# Patient Record
Sex: MALE | Race: WHITE | Employment: FULL TIME | URBAN - METROPOLITAN AREA
[De-identification: names, ages, dates, MRNs, and addresses within clinical notes are randomized per-mention and may not be internally consistent; named-entity substitution may affect disease eponyms.]

---

## 2018-12-13 ENCOUNTER — TRANSFERRED RECORDS (OUTPATIENT)
Dept: HEALTH INFORMATION MANAGEMENT | Facility: CLINIC | Age: 63
End: 2018-12-13

## 2018-12-14 ENCOUNTER — HOSPITAL ENCOUNTER (INPATIENT)
Facility: CLINIC | Age: 63
LOS: 1 days | Discharge: HOME | End: 2018-12-15
Attending: INTERNAL MEDICINE | Admitting: INTERNAL MEDICINE
Payer: COMMERCIAL

## 2018-12-14 DIAGNOSIS — J18.9 PNEUMONIA DUE TO INFECTIOUS ORGANISM, UNSPECIFIED LATERALITY, UNSPECIFIED PART OF LUNG: Primary | ICD-10-CM

## 2018-12-14 DIAGNOSIS — I10 ESSENTIAL HYPERTENSION: ICD-10-CM

## 2018-12-14 PROBLEM — R06.02 SOB (SHORTNESS OF BREATH): Status: ACTIVE | Noted: 2018-12-14

## 2018-12-14 LAB
ALT SERPL-CCNC: 16 U/L (ref 0–45)
AST SERPL-CCNC: 15 U/L (ref 0–40)
CREAT SERPL-MCNC: 0.76 MG/DL (ref 0.7–1.3)
GFR SERPL CREATININE-BSD FRML MDRD: >60 ML/MIN/1.73M2
GLUCOSE SERPL-MCNC: 98 MG/DL (ref 70–125)
MAGNESIUM SERPL-MCNC: 2.3 MG/DL (ref 1.6–2.3)
PHOSPHATE SERPL-MCNC: 2.3 MG/DL (ref 2.5–4.5)
POTASSIUM SERPL-SCNC: 3.5 MMOL/L (ref 3.5–5)
POTASSIUM SERPL-SCNC: 4.3 MMOL/L (ref 3.4–5.3)

## 2018-12-14 PROCEDURE — 83735 ASSAY OF MAGNESIUM: CPT | Performed by: PHYSICIAN ASSISTANT

## 2018-12-14 PROCEDURE — 84132 ASSAY OF SERUM POTASSIUM: CPT | Performed by: PHYSICIAN ASSISTANT

## 2018-12-14 PROCEDURE — 25000125 ZZHC RX 250: Performed by: PHYSICIAN ASSISTANT

## 2018-12-14 PROCEDURE — 40000275 ZZH STATISTIC RCP TIME EA 10 MIN

## 2018-12-14 PROCEDURE — 94640 AIRWAY INHALATION TREATMENT: CPT

## 2018-12-14 PROCEDURE — 99207 ZZC APP CREDIT; MD BILLING SHARED VISIT: CPT | Performed by: PHYSICIAN ASSISTANT

## 2018-12-14 PROCEDURE — 12000000 ZZH R&B MED SURG/OB

## 2018-12-14 PROCEDURE — 99223 1ST HOSP IP/OBS HIGH 75: CPT | Mod: AI | Performed by: INTERNAL MEDICINE

## 2018-12-14 PROCEDURE — 84100 ASSAY OF PHOSPHORUS: CPT | Performed by: PHYSICIAN ASSISTANT

## 2018-12-14 PROCEDURE — 36415 COLL VENOUS BLD VENIPUNCTURE: CPT | Performed by: PHYSICIAN ASSISTANT

## 2018-12-14 PROCEDURE — 94640 AIRWAY INHALATION TREATMENT: CPT | Mod: 76

## 2018-12-14 PROCEDURE — 25000128 H RX IP 250 OP 636: Performed by: PHYSICIAN ASSISTANT

## 2018-12-14 PROCEDURE — 25000132 ZZH RX MED GY IP 250 OP 250 PS 637: Performed by: PHYSICIAN ASSISTANT

## 2018-12-14 RX ORDER — LANOLIN ALCOHOL/MO/W.PET/CERES
100 CREAM (GRAM) TOPICAL DAILY
Status: DISCONTINUED | OUTPATIENT
Start: 2018-12-14 | End: 2018-12-15 | Stop reason: HOSPADM

## 2018-12-14 RX ORDER — MULTIPLE VITAMINS W/ MINERALS TAB 9MG-400MCG
1 TAB ORAL DAILY
Status: DISCONTINUED | OUTPATIENT
Start: 2018-12-14 | End: 2018-12-15 | Stop reason: HOSPADM

## 2018-12-14 RX ORDER — POTASSIUM CL/LIDO/0.9 % NACL 10MEQ/0.1L
10 INTRAVENOUS SOLUTION, PIGGYBACK (ML) INTRAVENOUS
Status: DISCONTINUED | OUTPATIENT
Start: 2018-12-14 | End: 2018-12-15 | Stop reason: HOSPADM

## 2018-12-14 RX ORDER — FOLIC ACID 1 MG/1
1 TABLET ORAL DAILY
Status: DISCONTINUED | OUTPATIENT
Start: 2018-12-14 | End: 2018-12-15 | Stop reason: HOSPADM

## 2018-12-14 RX ORDER — LISINOPRIL 10 MG/1
10 TABLET ORAL DAILY
Status: DISCONTINUED | OUTPATIENT
Start: 2018-12-14 | End: 2018-12-15 | Stop reason: HOSPADM

## 2018-12-14 RX ORDER — NALOXONE HYDROCHLORIDE 0.4 MG/ML
.1-.4 INJECTION, SOLUTION INTRAMUSCULAR; INTRAVENOUS; SUBCUTANEOUS
Status: DISCONTINUED | OUTPATIENT
Start: 2018-12-14 | End: 2018-12-15 | Stop reason: HOSPADM

## 2018-12-14 RX ORDER — ALBUTEROL SULFATE 0.83 MG/ML
3 SOLUTION RESPIRATORY (INHALATION)
Status: DISCONTINUED | OUTPATIENT
Start: 2018-12-14 | End: 2018-12-15 | Stop reason: HOSPADM

## 2018-12-14 RX ORDER — POTASSIUM CHLORIDE 7.45 MG/ML
10 INJECTION INTRAVENOUS
Status: DISCONTINUED | OUTPATIENT
Start: 2018-12-14 | End: 2018-12-15 | Stop reason: HOSPADM

## 2018-12-14 RX ORDER — BISACODYL 10 MG
10 SUPPOSITORY, RECTAL RECTAL DAILY PRN
Status: DISCONTINUED | OUTPATIENT
Start: 2018-12-14 | End: 2018-12-15 | Stop reason: HOSPADM

## 2018-12-14 RX ORDER — HYDRALAZINE HYDROCHLORIDE 20 MG/ML
10 INJECTION INTRAMUSCULAR; INTRAVENOUS EVERY 4 HOURS PRN
Status: DISCONTINUED | OUTPATIENT
Start: 2018-12-14 | End: 2018-12-15 | Stop reason: HOSPADM

## 2018-12-14 RX ORDER — POTASSIUM CHLORIDE 1500 MG/1
20-40 TABLET, EXTENDED RELEASE ORAL
Status: DISCONTINUED | OUTPATIENT
Start: 2018-12-14 | End: 2018-12-15 | Stop reason: HOSPADM

## 2018-12-14 RX ORDER — IPRATROPIUM BROMIDE AND ALBUTEROL SULFATE 2.5; .5 MG/3ML; MG/3ML
3 SOLUTION RESPIRATORY (INHALATION)
Status: DISCONTINUED | OUTPATIENT
Start: 2018-12-14 | End: 2018-12-15 | Stop reason: HOSPADM

## 2018-12-14 RX ORDER — AMOXICILLIN 250 MG
1 CAPSULE ORAL 2 TIMES DAILY PRN
Status: DISCONTINUED | OUTPATIENT
Start: 2018-12-14 | End: 2018-12-15 | Stop reason: HOSPADM

## 2018-12-14 RX ORDER — ACETAMINOPHEN 650 MG/1
650 SUPPOSITORY RECTAL EVERY 4 HOURS PRN
Status: DISCONTINUED | OUTPATIENT
Start: 2018-12-14 | End: 2018-12-15 | Stop reason: HOSPADM

## 2018-12-14 RX ORDER — SODIUM CHLORIDE 9 MG/ML
INJECTION, SOLUTION INTRAVENOUS CONTINUOUS
Status: ACTIVE | OUTPATIENT
Start: 2018-12-14 | End: 2018-12-14

## 2018-12-14 RX ORDER — AMOXICILLIN 250 MG
2 CAPSULE ORAL 2 TIMES DAILY PRN
Status: DISCONTINUED | OUTPATIENT
Start: 2018-12-14 | End: 2018-12-15 | Stop reason: HOSPADM

## 2018-12-14 RX ORDER — POLYETHYLENE GLYCOL 3350 17 G/17G
17 POWDER, FOR SOLUTION ORAL DAILY PRN
Status: DISCONTINUED | OUTPATIENT
Start: 2018-12-14 | End: 2018-12-15 | Stop reason: HOSPADM

## 2018-12-14 RX ORDER — ONDANSETRON 4 MG/1
4 TABLET, ORALLY DISINTEGRATING ORAL EVERY 6 HOURS PRN
Status: DISCONTINUED | OUTPATIENT
Start: 2018-12-14 | End: 2018-12-15 | Stop reason: HOSPADM

## 2018-12-14 RX ORDER — PROCHLORPERAZINE 25 MG
25 SUPPOSITORY, RECTAL RECTAL EVERY 12 HOURS PRN
Status: DISCONTINUED | OUTPATIENT
Start: 2018-12-14 | End: 2018-12-15 | Stop reason: HOSPADM

## 2018-12-14 RX ORDER — POTASSIUM CHLORIDE 1.5 G/1.58G
20-40 POWDER, FOR SOLUTION ORAL
Status: DISCONTINUED | OUTPATIENT
Start: 2018-12-14 | End: 2018-12-15 | Stop reason: HOSPADM

## 2018-12-14 RX ORDER — ONDANSETRON 2 MG/ML
4 INJECTION INTRAMUSCULAR; INTRAVENOUS EVERY 6 HOURS PRN
Status: DISCONTINUED | OUTPATIENT
Start: 2018-12-14 | End: 2018-12-15 | Stop reason: HOSPADM

## 2018-12-14 RX ORDER — POTASSIUM CHLORIDE 29.8 MG/ML
20 INJECTION INTRAVENOUS
Status: DISCONTINUED | OUTPATIENT
Start: 2018-12-14 | End: 2018-12-15 | Stop reason: HOSPADM

## 2018-12-14 RX ORDER — CEFTRIAXONE 2 G/1
2 INJECTION, POWDER, FOR SOLUTION INTRAMUSCULAR; INTRAVENOUS EVERY 24 HOURS
Status: DISCONTINUED | OUTPATIENT
Start: 2018-12-15 | End: 2018-12-15 | Stop reason: HOSPADM

## 2018-12-14 RX ORDER — PROCHLORPERAZINE MALEATE 10 MG
10 TABLET ORAL EVERY 6 HOURS PRN
Status: DISCONTINUED | OUTPATIENT
Start: 2018-12-14 | End: 2018-12-15 | Stop reason: HOSPADM

## 2018-12-14 RX ORDER — ACETAMINOPHEN 325 MG/1
650 TABLET ORAL EVERY 4 HOURS PRN
Status: DISCONTINUED | OUTPATIENT
Start: 2018-12-14 | End: 2018-12-15 | Stop reason: HOSPADM

## 2018-12-14 RX ADMIN — SODIUM CHLORIDE: 9 INJECTION, SOLUTION INTRAVENOUS at 08:47

## 2018-12-14 RX ADMIN — HYDRALAZINE HYDROCHLORIDE 10 MG: 20 INJECTION INTRAMUSCULAR; INTRAVENOUS at 10:21

## 2018-12-14 RX ADMIN — Medication 100 MG: at 08:45

## 2018-12-14 RX ADMIN — IPRATROPIUM BROMIDE AND ALBUTEROL SULFATE 3 ML: .5; 2.5 SOLUTION RESPIRATORY (INHALATION) at 20:25

## 2018-12-14 RX ADMIN — IPRATROPIUM BROMIDE AND ALBUTEROL SULFATE 3 ML: .5; 2.5 SOLUTION RESPIRATORY (INHALATION) at 10:57

## 2018-12-14 RX ADMIN — FOLIC ACID 1 MG: 1 TABLET ORAL at 08:46

## 2018-12-14 RX ADMIN — SODIUM CHLORIDE: 9 INJECTION, SOLUTION INTRAVENOUS at 18:40

## 2018-12-14 RX ADMIN — LISINOPRIL 10 MG: 10 TABLET ORAL at 08:46

## 2018-12-14 RX ADMIN — MULTIPLE VITAMINS W/ MINERALS TAB 1 TABLET: TAB at 08:45

## 2018-12-14 ASSESSMENT — ACTIVITIES OF DAILY LIVING (ADL)
ADLS_ACUITY_SCORE: 14
ADLS_ACUITY_SCORE: 12

## 2018-12-14 NOTE — H&P
Admitted:     12/14/2018      Treat as hospitalist admission.      PRIMARY CARE PROVIDER:  None.      CHIEF COMPLAINT:  Shortness of breath.      History is provided by the patient and his wife, Lilia.      HISTORY OF PRESENT ILLNESS:  Joseph Arce is a 63-year-old male with nicotine dependence and alcohol use disorder who does not follow with a primary care provider and has no known medical conditions.  He presented to the Emergency Department at [facility] with complaints of shortness of breath that awoke him from sleep around 10:00 a.m. on the evening prior to admission.  He was found to be hypoxic in mid-upper 80s.  He has a chronic cough at baseline, but it became worse and productive of more sputum approximately 1 week ago.  Additionally, he has a sore throat.  He denies fevers, chest pain, orthopnea, swelling, lower extremity swelling, dyspnea on exertion, chest pain and wheezing.     In the Emergency Department at [facility], the patient was found to have a high-normal heart rate in the 90s to 100s and persistently elevated blood pressure in the 150s to 190s.  Labwork revealed mild hyponatremia of 130, leukocytosis of 12.3, and elevated D-dimer 0.75.  A CT PE study showed a left lower lobe consolidation in addition to mild incidence of emphysematous changes and ascending aortic dilation of 4.2 cm.  BNP was within normal limits at 29.  Troponin was 0.09.  He was treated with aspirin, ceftriaxone, azithromycin, nitro and prednisone.  Blood cultures were obtained and are pending.      Presently, the patient is evaluated at bedside and overall appears comfortable.  His blood pressure remains elevated at 175 and 190s over 90s.  The patient has a 75-pack-year history of smoking and drinks about 6 to 10 beers daily.  He has never had seizures or hallucinations if he goes without drinking, and the last time he went without drinking was approximately 2 weeks ago for 1 day.  He does not follow with a primary care  provider and is reluctant to start any medications for no specific reason.      PAST MEDICAL HISTORY:   1.  Tobacco dependence.   2.  Alcohol use disorder.      PAST SURGICAL HISTORY:  None.      FAMILY HISTORY:  No known family history of CAD.   Sister:  LINDA, passed away at the age of 61.   Nephew:  Passed away at the age of 21 from cerebral palsy.   Mother:  Breast cancer and uterine cancer.   Father:  Throat cancer.      SOCIAL HISTORY:   1.5-pack-per-day smoker x50 years.   Consumes 6 to 10 beers daily.   No illicit drug use.     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     He is  and has 3 sons of his own and 1 stepdaughter.      PRIOR TO ADMISSION MEDICATIONS:     None.      ALLERGIES:  NO KNOWN DRUG ALLERGIES.      REVIEW OF SYSTEMS:  A complete review of systems was performed and was negative except for that noted in the HPI.      PHYSICAL EXAMINATION:   VITAL SIGNS:  Blood pressure 195/96, heart rate 104, temperature 98.3, respirations 16, oxygen saturation 95 percent.  Weight is 84.6 kg.   GENERAL:  A pleasant male who appears his stated age.  He looks overall comfortable, lying in bed.   SKIN:  Warm, dry.  No rash or lesions on exposed skin.   HEENT:  Normocephalic, atraumatic.  EOMs grossly intact.  PERRLA.  Dry mucous membranes and a crowded oropharynx.   NECK:  Supple.  No cervical lymphadenopathy or JVD appreciated.   LUNGS:  Breath sounds rhonchorous in the bilateral bases and the left upper lobe.  No increased work of breathing on room air.   CARDIOVASCULAR:  Mildly tachycardic.  No rub or murmur appreciated.  No peripheral edema.   ABDOMEN:  Soft, rotund, nontender, nondistended.   MUSCULOSKELETAL:  Moves all 4 extremities.   NEUROLOGIC:  Cranial nerves II to XII grossly intact.      LABORATORY DATA:  Reviewed in [employer] paper chart.   Sodium 130, potassium 3.5, chloride 96, creatinine 0.76.   WBC 12.3, hemoglobin 14.3, hematocrit 41, platelets 267.   BNP 29.   Troponin 0.09.   D-dimer 0.75.       IMAGING:  CTA chest.   1.  No pulmonary embolism or aortic dissection.   2.  Small focus of left lower lobe consolidation.  Recommended followup to resolution.   3.  Mild emphysema.   4.  A 4.2 fusiform aneurysmal dilation of the ascending aorta.   5.  Coronary artery disease and atherosclerotic vascular disease.   6.  Small hiatal hernia.      A chest x-ray does not show acute cardiopulmonary pathology.      ASSESSMENT AND PLAN:  Joseph Arce is a pleasant 63-year-old  male with alcohol use disorder and tobacco dependence who does not follow with a primary care provider who presented to [facility] ED with complaints of shortness of breath and was found to have left lower lobe pneumonia.    Left lower lobe pneumonia, community-acquired.  Productive cough x 1 week, leukocytosis 12.3, mild tachycardia, mildly hypoxic at ED mid-upper 80s, and CTA chest with small consolidation LLL.  Received initial dose of ceftriaxone and azithromycin at [facility] ED. Though SOB awoke from sleep, does not sound like PND or orthopnea but rather suspect has undiagnosed FELISA with decreased respiratory reserve from infection and underlying smoking history. Appears euvolemic and BNP wnl 29.  -- Normal saline IV fluids 100 mL x12 hours.   -- Continue Rocephin and azithromycin.   -- Scheduled DuoNeb q.i.d. while awake and p.r.n. albuterol every 2 hours.   -- Pulmonary toilet with incentive spirometry and Acapella valve.   -- Chest x-ray was negative.  Recommend followup CXR versus CTA chest in 4 weeks to evaluate for resolution versus alternative cause for left lower lobe consolidation.     Hypertension.  Persistently elevated at 150-190s/90s.   -- Start lisinopril 10 mg daily. Check BMP in 1-2 weeks.  -- P.r.n. hydralazine available.      Mild emphysema.  This is as per the CTA chest study.  Notable 75-pack-year history of smoking.   Tobacco use disorder.  Suspected FELISA.  -- Encouraged smoking cessation and discussed  dual-replacement nicotine therapy.   -- Recommend pulmonary function tests (PFTs) and sleep study in outpatient setting.      Alcohol use disorder.  Consumes 6-10 beers daily.  He denies history of seizure and hallucinations.  -- Encouraged alcohol moderation to no more than 14 per week or 3 per sitting.   -- CIWA without meds ordered.    CAD and arthrosclerotic disease.  These are based off of the CTA chest imaging study.  Risk factors include hypertension, suspected dyslipidemia, smoking, alcohol use disorder, obesity.  -- Consider a stress test in an outpatient setting.     Ascending aortic dilation, 4.2 cm.  Noted on CTA chest imaging with a fusiform aneurysmal dilation.   -- Optimize blood pressure control.   -- Serial monitoring in outpatient setting.     Deep venous thrombosis prophylaxis:  Ambulation.      CODE STATUS:  Full code.  I discussed the patient at time of admission.      DISPOSITION:  Anticipate discharge in 2 to 3 days.      This patient was discussed with Dr. Dmitri Walter of the Hospitalist Service who is in agreement with my assessment and plan of care as outlined above. Will need to establish care with primary provider.        DMITRI WALETR MD       As dictated by JOANNA ULMEN BARTHELL, PA-C            D: 2018   T: 2018   MT: XAVIER      Name:     RYLIE SALCEDO   MRN:      [id number]        Account:      [id number]   :      1955        Admitted:     2018                   Document: [id number]

## 2018-12-14 NOTE — PROGRESS NOTES
Hospitalist admission note dictated. Confirmation #: [id number].    JoAnna Barthell, PA-C  12/14/2018   8:35 AM.

## 2018-12-14 NOTE — PLAN OF CARE
A&Ox4, up independently. Denies SOB or chest pain. VSS ex HTN SBP in 180's- MD informed. R + L PIV SL'd. Discharge date pending, will continue to monitor.

## 2018-12-14 NOTE — PLAN OF CARE
Pt A&O. VSS on RA- ex tachy and hypertensive. Scheduled lisinopril started and hydralazine 10 mg given x1- effective. Tele: ST. CIWA scores of 0 on shift. No pain/nausea. LS diminished w/ rhonchi. IS and acapella at bedside- encouraged use. PIV infusing. Tolerating low Sodium diet. Voiding adequately. Up independently.

## 2018-12-14 NOTE — PHARMACY-ADMISSION MEDICATION HISTORY
Admission medication history interview status for the 12/14/2018  admission is complete. See EPIC admission navigator for prior to admission medications     Patient has no prior to admission medicaions.

## 2018-12-15 VITALS
DIASTOLIC BLOOD PRESSURE: 76 MMHG | HEART RATE: 101 BPM | SYSTOLIC BLOOD PRESSURE: 148 MMHG | TEMPERATURE: 97.2 F | RESPIRATION RATE: 17 BRPM | WEIGHT: 186.6 LBS | OXYGEN SATURATION: 95 %

## 2018-12-15 LAB
ANION GAP SERPL CALCULATED.3IONS-SCNC: 7 MMOL/L (ref 3–14)
BUN SERPL-MCNC: 11 MG/DL (ref 7–30)
CALCIUM SERPL-MCNC: 8.5 MG/DL (ref 8.5–10.1)
CHLORIDE SERPL-SCNC: 105 MMOL/L (ref 94–109)
CO2 SERPL-SCNC: 25 MMOL/L (ref 20–32)
CREAT SERPL-MCNC: 0.88 MG/DL (ref 0.66–1.25)
GFR SERPL CREATININE-BSD FRML MDRD: 88 ML/MIN/1.7M2
GLUCOSE SERPL-MCNC: 118 MG/DL (ref 70–99)
POTASSIUM SERPL-SCNC: 4.8 MMOL/L (ref 3.4–5.3)
SODIUM SERPL-SCNC: 137 MMOL/L (ref 133–144)
SODIUM SERPL-SCNC: NORMAL MMOL/L (ref 133–144)

## 2018-12-15 PROCEDURE — 40000275 ZZH STATISTIC RCP TIME EA 10 MIN

## 2018-12-15 PROCEDURE — 94640 AIRWAY INHALATION TREATMENT: CPT | Mod: 76

## 2018-12-15 PROCEDURE — 25000128 H RX IP 250 OP 636: Performed by: PHYSICIAN ASSISTANT

## 2018-12-15 PROCEDURE — 25000125 ZZHC RX 250: Performed by: PHYSICIAN ASSISTANT

## 2018-12-15 PROCEDURE — 36415 COLL VENOUS BLD VENIPUNCTURE: CPT | Performed by: INTERNAL MEDICINE

## 2018-12-15 PROCEDURE — 99239 HOSP IP/OBS DSCHRG MGMT >30: CPT | Performed by: INTERNAL MEDICINE

## 2018-12-15 PROCEDURE — 25000132 ZZH RX MED GY IP 250 OP 250 PS 637: Performed by: PHYSICIAN ASSISTANT

## 2018-12-15 PROCEDURE — 94640 AIRWAY INHALATION TREATMENT: CPT

## 2018-12-15 PROCEDURE — 80048 BASIC METABOLIC PNL TOTAL CA: CPT | Performed by: INTERNAL MEDICINE

## 2018-12-15 RX ORDER — CEFPODOXIME PROXETIL 200 MG/1
200 TABLET, FILM COATED ORAL 2 TIMES DAILY
Qty: 16 TABLET | Refills: 0 | Status: SHIPPED | OUTPATIENT
Start: 2018-12-15 | End: 2018-12-23

## 2018-12-15 RX ORDER — LISINOPRIL 10 MG/1
10 TABLET ORAL DAILY
Qty: 30 TABLET | Refills: 0 | Status: SHIPPED | OUTPATIENT
Start: 2018-12-16 | End: 2019-01-15

## 2018-12-15 RX ORDER — AZITHROMYCIN 250 MG/1
250 TABLET, FILM COATED ORAL DAILY
Qty: 3 TABLET | Refills: 0 | Status: SHIPPED | OUTPATIENT
Start: 2018-12-16 | End: 2018-12-19

## 2018-12-15 RX ADMIN — FOLIC ACID 1 MG: 1 TABLET ORAL at 09:27

## 2018-12-15 RX ADMIN — IPRATROPIUM BROMIDE AND ALBUTEROL SULFATE 3 ML: .5; 2.5 SOLUTION RESPIRATORY (INHALATION) at 07:30

## 2018-12-15 RX ADMIN — Medication 100 MG: at 09:27

## 2018-12-15 RX ADMIN — CEFTRIAXONE SODIUM 2 G: 2 INJECTION, POWDER, FOR SOLUTION INTRAMUSCULAR; INTRAVENOUS at 05:00

## 2018-12-15 RX ADMIN — IPRATROPIUM BROMIDE AND ALBUTEROL SULFATE 3 ML: .5; 2.5 SOLUTION RESPIRATORY (INHALATION) at 00:07

## 2018-12-15 RX ADMIN — AZITHROMYCIN MONOHYDRATE 250 MG: 500 INJECTION, POWDER, LYOPHILIZED, FOR SOLUTION INTRAVENOUS at 06:02

## 2018-12-15 RX ADMIN — MULTIPLE VITAMINS W/ MINERALS TAB 1 TABLET: TAB at 09:27

## 2018-12-15 RX ADMIN — LISINOPRIL 10 MG: 10 TABLET ORAL at 09:27

## 2018-12-15 ASSESSMENT — ACTIVITIES OF DAILY LIVING (ADL)
ADLS_ACUITY_SCORE: 12

## 2018-12-15 NOTE — PLAN OF CARE
[id number]: A&Ox4. VSS ex slightly hypertensive at times. PRN hydralazine available. Tele: SR. Denied pain/nausea/SOB. CIWA=0 x3. Lung sounds diminished. Infrequent nonproductive cough, still need sputum culture. IS and acapella encouraged. Tolerating 2g NA diet, good appetite. Up independently, steady gait. Strict I&Os for first 24 hours. PIV infusing NS @ 100ml/hr, discontinued at 2100 (12/14). Continues with IV abx. Wife at bedside, supportive. Plan pending clinical progress. Will continue to monitor.

## 2018-12-15 NOTE — PROGRESS NOTES
Pt VSS- blood pressure withiin normal limits.   Patient discharged at 11:59 AM to [facility]. IV was discontinued. No pain/nausea reported. Belongings returned to patient.  Discharge instructions and medications reviewed with patient.  Patient verbalized understanding and all questions were answered. At time of discharge, patient condition was stable and left the unit with his spouse to door 6 to be picked up.

## 2018-12-15 NOTE — DISCHARGE SUMMARY
[facility]  Discharge Summary  Hospitalist    Date of Admission:  12/14/2018  Date of Discharge:  12/15/2018 12:35 PM  Provider:  David Bruno DO, [facility]    Discharge Diagnoses   1.  Community acquired pneumonia, organism unknown  2.  Uncontrolled/untreated hypertension  3.  Incidentally noted 4.2 cm aneurysm ascending aorta    History of Present Illness   Joseph Arce is an 63 year old male who presented with respiratory complaints.  Please see the admission history and physical for full details.    Hospital Course   Joseph Arce was admitted on 12/14/2018.  The following problems were addressed during his hospitalization:    Mr. Arce presented to an outside facility and was referred here for admission.  He presented with respiratory complaints and imaging consistent with a pneumonia.  This was felt community acquired.  He was treated with ceftriaxone and azithromycin.  He felt better by the day of discharge and was sent home on a course of oral antibiotics.  He did not by exam have a COPD/asthma exacerbation.  Steroids/nebs were not prescribed at discharge.  He was noted to be hypertensive on presentation which improved with low dose lisinopril.  I suspect he has long term hypertension.  He hasn't followed with a provider for a long time.  He agrees to establish with his other family members [facility] PCP in the near future.  I also recommended he discuss other routine preventative cares with them including a sleep study.  He should have monitoring of his aneurysm also in the future.  I also discussed his medication risks and he expressed understanding.        Significant Results and Procedures   No procedures    Pending Results     Unresulted Labs Ordered in the Past 30 Days of this Admission     Date and Time Order Name Status Description    12/14/2018 0837 Sodium In process           Code Status   Full Code       Primary Care Physician   Physician No Ref-Primary    Blood pressure 148/76,  pulse 101, temperature 97.2  F (36.2  C), temperature source Axillary, resp. rate 17, weight 84.6 kg (186 lb 9.6 oz), SpO2 95 %.    Alert, oriented, heart regular.  Lungs clear upper with decreased breath sounds mildly bases.  No wheezes/retractions.      Discharge Disposition   Discharged to home    Consultations This Hospital Stay   None    Time Spent on this Encounter   IDavid, personally saw the patient today and spent greater than 30 minutes discharging this patient.    Discharge Orders      Reason for your hospital stay    Pneumonia     Follow-up and recommended labs and tests     Establish with primary care provider at your nearby clinic within the next 2 weeks for blood pressure and hospital follow-up.     Activity    Your activity upon discharge: activity as tolerated     When to contact your care team    Call if questions.  Notify provider if fevers, worsening shortness of breath, worsening diarrhea, other new medical concerns.     Diet    Follow this diet upon discharge: No added salt     Discharge Medications   Current Discharge Medication List      START taking these medications    Details   azithromycin (ZITHROMAX) 250 MG tablet Take 1 tablet (250 mg) by mouth daily for 3 doses  Qty: 3 tablet, Refills: 0    Associated Diagnoses: Pneumonia due to infectious organism, unspecified laterality, unspecified part of lung      cefpodoxime (VANTIN) 200 MG tablet Take 1 tablet (200 mg) by mouth 2 times daily for 8 days  Qty: 16 tablet, Refills: 0    Comments: May sub a similar insurance preferred generic  Associated Diagnoses: Pneumonia due to infectious organism, unspecified laterality, unspecified part of lung      lisinopril (PRINIVIL/ZESTRIL) 10 MG tablet Take 1 tablet (10 mg) by mouth daily  Qty: 30 tablet, Refills: 0    Associated Diagnoses: Essential hypertension           Data       Recent Labs   Lab 12/15/18  0933 12/14/18  0837    Canceled, Test credited   POTASSIUM 4.8 4.3   CHLORIDE  105  --    CO2 25  --    ANIONGAP 7  --    *  --    BUN 11  --    CR 0.88  --    GFRESTIMATED 88  --    GFRESTBLACK >90  --    GISELA 8.5  --    MAG  --  2.3   PHOS  --  2.3*     Outside CT chest imaging:  IMPRESSION:   CONCLUSION:  1.  No pulmonary embolus or aortic dissection.  2.  Small focus of left lower lobe consolidation correlate to exclude pneumonia. Recommend follow-up to resolution.  3.  Mild emphysema.  4.  4.2 cm fusiform aneurysmal dilation of the ascending aorta.  5.  Coronary artery disease and atherosclerotic vascular disease.  6.  Small hiatal hernia.

## 2018-12-15 NOTE — PLAN OF CARE
[id number]: A&Ox4. VSS ex slightly hypertensive (SBP 150s) and tachycardic. Tele: ST. Denies pain/nausea/SOB. CIWA=0. Lung sounds diminished. Infrequent nonproductive cough, still need sputum culture. IS and acapella encouraged. Tolerating 2g NA diet, good appetite. Up independently, steady gait. Strict I&Os for first 24 hours. PIV infusing NS @ 100ml/hr, to be discontinued at 2100. Continues with IV abx. Wife at bedside, supportive. Plan pending clinical progress. Will continue to monitor.